# Patient Record
(demographics unavailable — no encounter records)

---

## 2024-11-01 NOTE — HISTORY OF PRESENT ILLNESS
[FreeTextEntry1] : This is a 58 y/o male with no known implantable devices noted with PMHX of CAD with multivessel disease (AWSTEMI in 6/2022 to [facility]), Chronic Systolic HF, DMT2, CM CKD (cr 2.86), HTN, HLD, PAD, Right foot OM, s/p partial hallux amputation and episode of syncope in past. Pt with recent complaints of SOB . Had abnormal stress test on 10/2/24 showed large severe fixed perfusion defect in the mid to apical anteroseptal mid to apical inferoseptum and mid inferior wall segments. Now sp [facility] with Dr. Nicholas Fong today that revealed MVD.   TTE from Nov 2022 with EF 10%, Estimated pulmonary artery systolic pressure equals 38 mm Hg, assuming right atrial pressure equals 10  mm Hg, consistent with borderline pulmonary hypertension.    atorvastatin 40 mg oral tablet: Last Dose Taken:  , 1 tab(s) orally once a day (at bedtime)  torsemide 20 mg oral tablet: Last Dose Taken:  , 1 tab(s) orally 3 times a week  Coreg 12.5 mg oral tablet: Last Dose Taken:  , 1 tab(s) orally 2 times a day  Januvia 100 mg oral tablet: Last Dose Taken:  , 1 tab(s) orally once a day  Entresto 49 mg-51 mg oral tablet: Last Dose Taken:  , 1 tab(s) orally 2 times a day  Cardiac cath 11/1/2024: The coronary circulation is left dominant.   LM Left main artery: Angiography shows no disease.   LAD Left anterior descending artery: There is a 100 % stenosis in the middle third portion of the segment. First diagonal: There is a 90 % stenosis in the proximal third portion of the segment.   CX Circumflex: There is an 80 % stenosis in the middle third portion of the segment. First obtuse marginal: The segment is extremely small. There is a 90 % stenosis in the ostium portion of the segment. Second obtuse marginal: There is a 90 % stenosis in the ostium portion of the segment. First left posterolateral: There is a 90 % stenosis in the middle third portion of the segment.   RCA  Right coronary artery: There is a 100 % stenosis.